# Patient Record
Sex: MALE | URBAN - METROPOLITAN AREA
[De-identification: names, ages, dates, MRNs, and addresses within clinical notes are randomized per-mention and may not be internally consistent; named-entity substitution may affect disease eponyms.]

---

## 2019-04-04 ENCOUNTER — APPOINTMENT (RX ONLY)
Dept: URBAN - METROPOLITAN AREA CLINIC 111 | Facility: CLINIC | Age: 59
Setting detail: DERMATOLOGY
End: 2019-04-04

## 2019-04-04 DIAGNOSIS — D22 MELANOCYTIC NEVI: ICD-10-CM

## 2019-04-04 DIAGNOSIS — Q82.5 CONGENITAL NON-NEOPLASTIC NEVUS: ICD-10-CM

## 2019-04-04 DIAGNOSIS — L82.1 OTHER SEBORRHEIC KERATOSIS: ICD-10-CM

## 2019-04-04 DIAGNOSIS — L81.4 OTHER MELANIN HYPERPIGMENTATION: ICD-10-CM

## 2019-04-04 DIAGNOSIS — L57.0 ACTINIC KERATOSIS: ICD-10-CM

## 2019-04-04 DIAGNOSIS — Z71.89 OTHER SPECIFIED COUNSELING: ICD-10-CM

## 2019-04-04 DIAGNOSIS — D18.0 HEMANGIOMA: ICD-10-CM

## 2019-04-04 PROBLEM — I10 ESSENTIAL (PRIMARY) HYPERTENSION: Status: ACTIVE | Noted: 2019-04-04

## 2019-04-04 PROBLEM — D22.39 MELANOCYTIC NEVI OF OTHER PARTS OF FACE: Status: ACTIVE | Noted: 2019-04-04

## 2019-04-04 PROBLEM — E78.5 HYPERLIPIDEMIA, UNSPECIFIED: Status: ACTIVE | Noted: 2019-04-04

## 2019-04-04 PROBLEM — D18.01 HEMANGIOMA OF SKIN AND SUBCUTANEOUS TISSUE: Status: ACTIVE | Noted: 2019-04-04

## 2019-04-04 PROBLEM — D22.5 MELANOCYTIC NEVI OF TRUNK: Status: ACTIVE | Noted: 2019-04-04

## 2019-04-04 PROCEDURE — 17000 DESTRUCT PREMALG LESION: CPT

## 2019-04-04 PROCEDURE — 99213 OFFICE O/P EST LOW 20 MIN: CPT | Mod: 25

## 2019-04-04 PROCEDURE — 17003 DESTRUCT PREMALG LES 2-14: CPT

## 2019-04-04 PROCEDURE — ? LIQUID NITROGEN

## 2019-04-04 PROCEDURE — ? COUNSELING

## 2019-04-04 ASSESSMENT — LOCATION DETAILED DESCRIPTION DERM
LOCATION DETAILED: LEFT MID-UPPER BACK
LOCATION DETAILED: LEFT MEDIAL EYEBROW
LOCATION DETAILED: LEFT ANTITRAGUS
LOCATION DETAILED: RIGHT ANTERIOR PROXIMAL UPPER ARM
LOCATION DETAILED: RIGHT PROXIMAL PRETIBIAL REGION
LOCATION DETAILED: LEFT PROXIMAL PRETIBIAL REGION
LOCATION DETAILED: LEFT ANTIHELIX
LOCATION DETAILED: RIGHT POSTERIOR SHOULDER
LOCATION DETAILED: RIGHT SUPERIOR CRUS OF ANTIHELIX
LOCATION DETAILED: INFERIOR THORACIC SPINE
LOCATION DETAILED: LEFT INFERIOR HELIX
LOCATION DETAILED: LEFT SUPERIOR HELIX
LOCATION DETAILED: RIGHT CENTRAL EYEBROW
LOCATION DETAILED: LEFT PROXIMAL POSTERIOR UPPER ARM
LOCATION DETAILED: RIGHT SUPERIOR MEDIAL UPPER BACK
LOCATION DETAILED: RIGHT INFERIOR MEDIAL UPPER BACK
LOCATION DETAILED: PERIUMBILICAL SKIN
LOCATION DETAILED: LEFT SUPERIOR CRUS OF ANTIHELIX
LOCATION DETAILED: LEFT INFERIOR ANTERIOR NECK
LOCATION DETAILED: STERNUM

## 2019-04-04 ASSESSMENT — LOCATION ZONE DERM
LOCATION ZONE: ARM
LOCATION ZONE: LEG
LOCATION ZONE: FACE
LOCATION ZONE: NECK
LOCATION ZONE: TRUNK
LOCATION ZONE: EAR

## 2019-04-04 ASSESSMENT — LOCATION SIMPLE DESCRIPTION DERM
LOCATION SIMPLE: RIGHT EYEBROW
LOCATION SIMPLE: LEFT UPPER BACK
LOCATION SIMPLE: LEFT EYEBROW
LOCATION SIMPLE: RIGHT PRETIBIAL REGION
LOCATION SIMPLE: RIGHT EAR
LOCATION SIMPLE: RIGHT UPPER BACK
LOCATION SIMPLE: LEFT EAR
LOCATION SIMPLE: CHEST
LOCATION SIMPLE: LEFT PRETIBIAL REGION
LOCATION SIMPLE: ABDOMEN
LOCATION SIMPLE: LEFT UPPER ARM
LOCATION SIMPLE: LEFT ANTERIOR NECK
LOCATION SIMPLE: UPPER BACK
LOCATION SIMPLE: RIGHT UPPER ARM
LOCATION SIMPLE: RIGHT SHOULDER

## 2019-04-04 NOTE — HPI: EVALUATION OF SKIN LESION(S)
What Type Of Note Output Would You Prefer (Optional)?: Standard Output
Hpi Title: Evaluation of Skin Lesions
Additional History: Patient requests full body skin exam. Last skin exam was 11/2016

## 2019-04-04 NOTE — PROCEDURE: LIQUID NITROGEN
Consent: The patient's consent was obtained including but not limited to risks of crusting, scabbing, blistering, scarring, darker or lighter pigmentary change, recurrence, incomplete removal and infection.
Detail Level: Zone
Post-Care Instructions: I reviewed with the patient in detail post-care instructions. Patient is to wear sunprotection, and avoid picking at any of the treated lesions. Patient may apply Polysporin or Vaseline to crusted or scabbing areas.
Duration Of Freeze Thaw-Cycle (Seconds): 0
Number Of Freeze-Thaw Cycles: 1 freeze-thaw cycle
Render Post-Care Instructions In Note?: no

## 2020-05-19 ENCOUNTER — APPOINTMENT (RX ONLY)
Dept: URBAN - METROPOLITAN AREA CLINIC 111 | Facility: CLINIC | Age: 60
Setting detail: DERMATOLOGY
End: 2020-05-19

## 2020-05-19 DIAGNOSIS — L57.0 ACTINIC KERATOSIS: ICD-10-CM

## 2020-05-19 DIAGNOSIS — Q82.5 CONGENITAL NON-NEOPLASTIC NEVUS: ICD-10-CM

## 2020-05-19 DIAGNOSIS — D22 MELANOCYTIC NEVI: ICD-10-CM

## 2020-05-19 DIAGNOSIS — L82.1 OTHER SEBORRHEIC KERATOSIS: ICD-10-CM

## 2020-05-19 DIAGNOSIS — L81.4 OTHER MELANIN HYPERPIGMENTATION: ICD-10-CM

## 2020-05-19 DIAGNOSIS — D18.0 HEMANGIOMA: ICD-10-CM

## 2020-05-19 DIAGNOSIS — Z71.89 OTHER SPECIFIED COUNSELING: ICD-10-CM

## 2020-05-19 PROBLEM — D18.01 HEMANGIOMA OF SKIN AND SUBCUTANEOUS TISSUE: Status: ACTIVE | Noted: 2020-05-19

## 2020-05-19 PROBLEM — D22.5 MELANOCYTIC NEVI OF TRUNK: Status: ACTIVE | Noted: 2020-05-19

## 2020-05-19 PROCEDURE — ? SUNSCREEN RECOMMENDATIONS

## 2020-05-19 PROCEDURE — 99213 OFFICE O/P EST LOW 20 MIN: CPT | Mod: 25

## 2020-05-19 PROCEDURE — ? COUNSELING

## 2020-05-19 PROCEDURE — 17000 DESTRUCT PREMALG LESION: CPT

## 2020-05-19 PROCEDURE — 17003 DESTRUCT PREMALG LES 2-14: CPT

## 2020-05-19 PROCEDURE — ? LIQUID NITROGEN

## 2020-05-19 ASSESSMENT — LOCATION SIMPLE DESCRIPTION DERM
LOCATION SIMPLE: POSTERIOR NECK
LOCATION SIMPLE: RIGHT UPPER BACK
LOCATION SIMPLE: LEFT UPPER BACK
LOCATION SIMPLE: RIGHT UPPER ARM
LOCATION SIMPLE: UPPER BACK
LOCATION SIMPLE: ABDOMEN
LOCATION SIMPLE: RIGHT CALF
LOCATION SIMPLE: RIGHT FOREHEAD
LOCATION SIMPLE: RIGHT TEMPLE
LOCATION SIMPLE: LEFT EAR
LOCATION SIMPLE: LEFT UPPER ARM
LOCATION SIMPLE: LEFT CALF
LOCATION SIMPLE: RIGHT EAR

## 2020-05-19 ASSESSMENT — LOCATION DETAILED DESCRIPTION DERM
LOCATION DETAILED: RIGHT PROXIMAL POSTERIOR UPPER ARM
LOCATION DETAILED: EPIGASTRIC SKIN
LOCATION DETAILED: INFERIOR THORACIC SPINE
LOCATION DETAILED: LEFT SCAPHA
LOCATION DETAILED: LEFT PROXIMAL CALF
LOCATION DETAILED: RIGHT PROXIMAL CALF
LOCATION DETAILED: RIGHT SUPERIOR HELIX
LOCATION DETAILED: LEFT MEDIAL UPPER BACK
LOCATION DETAILED: LEFT SUPERIOR HELIX
LOCATION DETAILED: RIGHT MEDIAL FOREHEAD
LOCATION DETAILED: RIGHT INFERIOR MEDIAL UPPER BACK
LOCATION DETAILED: RIGHT FOREHEAD
LOCATION DETAILED: RIGHT CENTRAL TEMPLE
LOCATION DETAILED: LEFT PROXIMAL POSTERIOR UPPER ARM
LOCATION DETAILED: LEFT DISTAL POSTERIOR UPPER ARM
LOCATION DETAILED: LEFT POSTERIOR NECK

## 2020-05-19 ASSESSMENT — LOCATION ZONE DERM
LOCATION ZONE: ARM
LOCATION ZONE: NECK
LOCATION ZONE: EAR
LOCATION ZONE: TRUNK
LOCATION ZONE: LEG
LOCATION ZONE: FACE

## 2020-05-19 NOTE — PROCEDURE: LIQUID NITROGEN
Render Note In Bullet Format When Appropriate: No
Post-Care Instructions: CARE INSTRUCTIONS\\nCryotherapy\\n\\nThis information is also available at [email or web address] \\n\\nCryotherapy, using liquid nitrogen, creates a superficial chemical burn. This is used to destroy multiple types of benign and even precancerous lesions on the skin.\\nWhat To Expect\\n    • Anticipate redness, with itching or burning pain initially. Tylenol (if you are able to take Tylenol) or cold compresses can help with any significant discomfort. \\n    • Blisters may occur at the treated area(s) over the next few days. The blister may be clear or bloody and may begin to weep or drain. \\n    • After lesion heals, skin may be slightly darker or lighter at treatment site; this typically fades within a few weeks.\\nCare of Treated Area(s)\\n    • Wash gently each day, but do not scrub. \\n    • Apply Vaseline frequently to minimize irritation and reduce scab formation. \\n    • If the blister is tense and uncomfortable, you may clean with alcohol and puncture with a sterile (cleansed in alcohol) needle. Wear gloves, or wash hands immediately before and after this procedure. \\n    • If open or draining, you may apply Polysporin antibiotic ointment or Vaseline and a bandage, if needed. \\n    • Do not pick at or pull off scab. Allow to completely heal.\\nReturn to Clinic\\n    • If lesion is persistent 3 to 4 weeks after treatment, please return to clinic for additional treatment.\\nNOTE: Cryotherapy may not fully destroy every lesion, every time.  Some lesions may require multiple treatments (which could lead to multiple charges).  Others may require a biopsy if destruction is not effective.
Consent: The patient's consent was obtained including but not limited to risks of crusting, scabbing, blistering, scarring, darker or lighter pigmentary change, recurrence, incomplete removal and infection.
Duration Of Freeze Thaw-Cycle (Seconds): 0
Number Of Freeze-Thaw Cycles: 1 freeze-thaw cycle
Detail Level: Simple

## 2020-05-19 NOTE — PROCEDURE: SUNSCREEN RECOMMENDATIONS

## 2021-05-18 ENCOUNTER — APPOINTMENT (RX ONLY)
Dept: URBAN - METROPOLITAN AREA CLINIC 111 | Facility: CLINIC | Age: 61
Setting detail: DERMATOLOGY
End: 2021-05-18

## 2021-05-18 DIAGNOSIS — D18.0 HEMANGIOMA: ICD-10-CM

## 2021-05-18 DIAGNOSIS — L57.8 OTHER SKIN CHANGES DUE TO CHRONIC EXPOSURE TO NONIONIZING RADIATION: ICD-10-CM

## 2021-05-18 DIAGNOSIS — D22 MELANOCYTIC NEVI: ICD-10-CM

## 2021-05-18 DIAGNOSIS — L57.0 ACTINIC KERATOSIS: ICD-10-CM

## 2021-05-18 DIAGNOSIS — Q82.5 CONGENITAL NON-NEOPLASTIC NEVUS: ICD-10-CM

## 2021-05-18 DIAGNOSIS — F42.4 EXCORIATION (SKIN-PICKING) DISORDER: ICD-10-CM

## 2021-05-18 DIAGNOSIS — L82.1 OTHER SEBORRHEIC KERATOSIS: ICD-10-CM

## 2021-05-18 DIAGNOSIS — D485 NEOPLASM OF UNCERTAIN BEHAVIOR OF SKIN: ICD-10-CM

## 2021-05-18 DIAGNOSIS — L81.4 OTHER MELANIN HYPERPIGMENTATION: ICD-10-CM

## 2021-05-18 DIAGNOSIS — Z71.89 OTHER SPECIFIED COUNSELING: ICD-10-CM

## 2021-05-18 PROBLEM — S50.912A UNSPECIFIED SUPERFICIAL INJURY OF LEFT FOREARM, INITIAL ENCOUNTER: Status: ACTIVE | Noted: 2021-05-18

## 2021-05-18 PROBLEM — D22.5 MELANOCYTIC NEVI OF TRUNK: Status: ACTIVE | Noted: 2021-05-18

## 2021-05-18 PROBLEM — D48.5 NEOPLASM OF UNCERTAIN BEHAVIOR OF SKIN: Status: ACTIVE | Noted: 2021-05-18

## 2021-05-18 PROBLEM — D18.01 HEMANGIOMA OF SKIN AND SUBCUTANEOUS TISSUE: Status: ACTIVE | Noted: 2021-05-18

## 2021-05-18 PROCEDURE — 11102 TANGNTL BX SKIN SINGLE LES: CPT

## 2021-05-18 PROCEDURE — ? LIQUID NITROGEN

## 2021-05-18 PROCEDURE — 17003 DESTRUCT PREMALG LES 2-14: CPT

## 2021-05-18 PROCEDURE — ? COUNSELING

## 2021-05-18 PROCEDURE — 17000 DESTRUCT PREMALG LESION: CPT | Mod: 59

## 2021-05-18 PROCEDURE — ? SUNSCREEN RECOMMENDATIONS

## 2021-05-18 PROCEDURE — ? BIOPSY BY SHAVE METHOD

## 2021-05-18 PROCEDURE — 99213 OFFICE O/P EST LOW 20 MIN: CPT | Mod: 25

## 2021-05-18 ASSESSMENT — LOCATION DETAILED DESCRIPTION DERM
LOCATION DETAILED: LEFT PROXIMAL POSTERIOR UPPER ARM
LOCATION DETAILED: RIGHT INFERIOR MEDIAL MIDBACK
LOCATION DETAILED: LEFT DISTAL POSTERIOR UPPER ARM
LOCATION DETAILED: LEFT SUPERIOR POSTERIOR HELIX
LOCATION DETAILED: EPIGASTRIC SKIN
LOCATION DETAILED: LEFT PROXIMAL DORSAL FOREARM
LOCATION DETAILED: LEFT POSTERIOR NECK
LOCATION DETAILED: LEFT RADIAL DORSAL HAND
LOCATION DETAILED: INFERIOR THORACIC SPINE
LOCATION DETAILED: LEFT CENTRAL MALAR CHEEK
LOCATION DETAILED: RIGHT MEDIAL FOREHEAD
LOCATION DETAILED: RIGHT PROXIMAL CALF
LOCATION DETAILED: RIGHT RADIAL DORSAL HAND
LOCATION DETAILED: RIGHT PROXIMAL POSTERIOR UPPER ARM
LOCATION DETAILED: RIGHT INFERIOR POSTERIOR HELIX
LOCATION DETAILED: RIGHT INFERIOR MEDIAL UPPER BACK
LOCATION DETAILED: RIGHT ANTERIOR EARLOBE
LOCATION DETAILED: RIGHT INFERIOR CENTRAL MALAR CHEEK
LOCATION DETAILED: LEFT MEDIAL UPPER BACK
LOCATION DETAILED: LEFT PROXIMAL CALF
LOCATION DETAILED: LEFT INFERIOR CENTRAL MALAR CHEEK
LOCATION DETAILED: LEFT LATERAL EYEBROW
LOCATION DETAILED: LEFT INFERIOR POSTERIOR HELIX
LOCATION DETAILED: RIGHT SUPERIOR POSTERIOR HELIX

## 2021-05-18 ASSESSMENT — LOCATION SIMPLE DESCRIPTION DERM
LOCATION SIMPLE: LEFT EAR
LOCATION SIMPLE: LEFT CALF
LOCATION SIMPLE: RIGHT LOWER BACK
LOCATION SIMPLE: RIGHT EAR
LOCATION SIMPLE: POSTERIOR NECK
LOCATION SIMPLE: LEFT EYEBROW
LOCATION SIMPLE: ABDOMEN
LOCATION SIMPLE: UPPER BACK
LOCATION SIMPLE: RIGHT HAND
LOCATION SIMPLE: RIGHT FOREHEAD
LOCATION SIMPLE: LEFT FOREARM
LOCATION SIMPLE: LEFT CHEEK
LOCATION SIMPLE: LEFT HAND
LOCATION SIMPLE: LEFT UPPER ARM
LOCATION SIMPLE: RIGHT CHEEK
LOCATION SIMPLE: RIGHT UPPER BACK
LOCATION SIMPLE: LEFT UPPER BACK
LOCATION SIMPLE: RIGHT CALF
LOCATION SIMPLE: RIGHT UPPER ARM

## 2021-05-18 ASSESSMENT — LOCATION ZONE DERM
LOCATION ZONE: HAND
LOCATION ZONE: ARM
LOCATION ZONE: EAR
LOCATION ZONE: NECK
LOCATION ZONE: TRUNK
LOCATION ZONE: FACE
LOCATION ZONE: LEG

## 2021-05-18 NOTE — PROCEDURE: BIOPSY BY SHAVE METHOD
Body Location Override (Optional - Billing Will Still Be Based On Selected Body Map Location If Applicable): right dorsal hand
Detail Level: Detailed
Depth Of Biopsy: dermis
Was A Bandage Applied: Yes
Size Of Lesion In Cm: 1
X Size Of Lesion In Cm: 0
Biopsy Type: H and E
Biopsy Method: Dermablade
Anesthesia Type: 1% lidocaine with epinephrine
Anesthesia Volume In Cc (Will Not Render If 0): 0.5
Hemostasis: Drysol
Wound Care: Polysporin ointment
Dressing: bandage
Destruction After The Procedure: No
Type Of Destruction Used: Curettage
Curettage Text: The wound bed was treated with curettage after the biopsy was performed.
Cryotherapy Text: The wound bed was treated with cryotherapy after the biopsy was performed.
Electrodesiccation Text: The wound bed was treated with electrodesiccation after the biopsy was performed.
Electrodesiccation And Curettage Text: The wound bed was treated with electrodesiccation and curettage after the biopsy was performed.
Silver Nitrate Text: The wound bed was treated with silver nitrate after the biopsy was performed.
Lab: [id number]
Lab Facility: [id number]
Consent: Verbal consent was obtained and risks were reviewed including but not limited to scarring, infection, bleeding, scabbing, incomplete removal, nerve damage and allergy to anesthesia.
Post-Care Instructions: CARE INSTRUCTIONS: SHAVE BIOPSY \\nThis information is also available on our website: [email or web address]\\nWound Site Care\\n    • Keep wound dry today and remove bandage in 24 hours. \\n    • Shower normally, allowing soap and water to run across the wound, but do not scrub. Antibacterial soaps, such as Dial, may be used daily. \\n    • Keep wound moist with application of Polysporin ointment or Vaseline 1-2 times daily. (If you have itching or irritation from topical antibiotics like Polysporin, then switch to plain Vaseline.) Keeping the wound moist reduces infection, minimizes scarring, and prevents crust formation over the wound. \\n    • Cover with clean bandage daily. If you have steri-strips (small white bandages) across the wound, leave intact. These typically fall off within a few days. \\n    • Avoid picking at the wound site, which increases risk of infection and scarring. \\n    • If wound is near the eyes, cold compresses may be used for 20 minutes each hour to minimize pain, swelling, and bruising. Puffiness under the eyes is possible for a few days after the procedure. \\n    • For any wound discomfort, you may take Tylenol (or acetaminophen). Adults may take 500-1000 mg every six hours (if you are able to take Tylenol). Or use cold compresses for up to 20 minutes hourly as needed. \\n    • Allow several weeks for wound to fully heal. Temporary discoloration at the wound site is normal and may take several months to fade.\\nIf You Have Bleeding\\n    • Hold firm pressure for 20 minutes, without lifting the pressure to look at wound. Repeat as needed. \\n    • If bleeding does not stop, apply ice compresses and call our office (or report to the nearest ER.)\\nActivities to Avoid\\nIf your wound is large or has sutures, then until sutures are removed you should:\\n    • Avoid contaminated water (lake, river, or ocean); use waterproof bandage in chlorinated pool. \\n    • If sutures are present, minimize activity that produces stress to excision site (heavy lifting or exercise that pulls at the wound, such as lunges for thigh wounds or golf for upper back wounds)\\nWatch for Infection\\n    • Slight redness, initial tenderness, and clear yellow discharge are normal. \\n    • Call the office if you have signs of infection, such as increased tenderness, warmth, spreading redness, swelling, or thick white-to-yellow discharge. \\n    • Seek urgent medical attention for severe infection, with fever, chills, nausea, or pain radiating from the wound to surrounding tissue.\\nNext Steps\\n    • If you have sutures, return for suture removal in 5-14 days, as directed by physician, but call sooner if you are concerned about your wound. \\n    • Pathology results will be given by telephone in 7-10 days. Sometimes results may take longer depending on if special stains or testing is needed. If you have not received results after 14 days it is important that you call our office and let us know. \\n    • Return to clinic if you notice any new or changing moles, or if physician recommended regular skin exams.\\nLaboratory Billing Information\\n    • Your specimen has been sent for laboratory testing. The lab our office typically uses is [facility], if you have any questions about your bill.\\n    • If you have insurance coverage, we have forwarded your insurance details to the lab. If a balance is owed once the claim has been processed, you will receive an invoice from the laboratory directly
Notification Instructions: Patient will be notified of biopsy results. However, patient instructed to call the office if not contacted within 2 weeks.
Billing Type: Third-Party Bill
Information: Selecting Yes will display possible errors in your note based on the variables you have selected. This validation is only offered as a suggestion for you. PLEASE NOTE THAT THE VALIDATION TEXT WILL BE REMOVED WHEN YOU FINALIZE YOUR NOTE. IF YOU WANT TO FAX A PRELIMINARY NOTE YOU WILL NEED TO TOGGLE THIS TO 'NO' IF YOU DO NOT WANT IT IN YOUR FAXED NOTE.

## 2021-05-18 NOTE — HPI: EVALUATION OF SKIN LESION(S)
What Type Of Note Output Would You Prefer (Optional)?: Standard Output
How Severe Are Your Spot(S)?: moderate
Hpi Title: Evaluation of Skin Lesions
Additional History: Last FBSE 5/2020 with [facility].

## 2021-05-18 NOTE — PROCEDURE: SUNSCREEN RECOMMENDATIONS

## 2021-05-18 NOTE — PROCEDURE: LIQUID NITROGEN
Consent: The patient's consent was obtained including but not limited to risks of crusting, scabbing, blistering, scarring, darker or lighter pigmentary change, recurrence, incomplete removal and infection.
Post-Care Instructions: CARE INSTRUCTIONS\\nCryotherapy\\n\\nThis information is also available at [email or web address] \\n\\nCryotherapy, using liquid nitrogen, creates a superficial chemical burn. This is used to destroy multiple types of benign and even precancerous lesions on the skin.\\nWhat To Expect\\n    • Anticipate redness, with itching or burning pain initially. Tylenol (if you are able to take Tylenol) or cold compresses can help with any significant discomfort. \\n    • Blisters may occur at the treated area(s) over the next few days. The blister may be clear or bloody and may begin to weep or drain. \\n    • After lesion heals, skin may be slightly darker or lighter at treatment site; this typically fades within a few weeks.\\nCare of Treated Area(s)\\n    • Wash gently each day, but do not scrub. \\n    • Apply Vaseline frequently to minimize irritation and reduce scab formation. \\n    • If the blister is tense and uncomfortable, you may clean with alcohol and puncture with a sterile (cleansed in alcohol) needle. Wear gloves, or wash hands immediately before and after this procedure. \\n    • If open or draining, you may apply Polysporin antibiotic ointment or Vaseline and a bandage, if needed. \\n    • Do not pick at or pull off scab. Allow to completely heal.\\nReturn to Clinic\\n    • If lesion is persistent 3 to 4 weeks after treatment, please return to clinic for additional treatment.\\nNOTE: Cryotherapy may not fully destroy every lesion, every time.  Some lesions may require multiple treatments (which could lead to multiple charges).  Others may require a biopsy if destruction is not effective.
Detail Level: Simple
Render Post-Care Instructions In Note?: no
Duration Of Freeze Thaw-Cycle (Seconds): 0
Number Of Freeze-Thaw Cycles: 1 freeze-thaw cycle

## 2021-06-01 ENCOUNTER — APPOINTMENT (RX ONLY)
Dept: URBAN - METROPOLITAN AREA CLINIC 111 | Facility: CLINIC | Age: 61
Setting detail: DERMATOLOGY
End: 2021-06-01

## 2021-06-01 DIAGNOSIS — L57.0 ACTINIC KERATOSIS: ICD-10-CM

## 2021-06-01 PROCEDURE — ? COUNSELING

## 2021-06-01 PROCEDURE — ? LIQUID NITROGEN

## 2021-06-01 PROCEDURE — 17003 DESTRUCT PREMALG LES 2-14: CPT

## 2021-06-01 PROCEDURE — 17000 DESTRUCT PREMALG LESION: CPT

## 2021-06-01 ASSESSMENT — LOCATION SIMPLE DESCRIPTION DERM
LOCATION SIMPLE: RIGHT CHEEK
LOCATION SIMPLE: RIGHT HAND

## 2021-06-01 ASSESSMENT — LOCATION ZONE DERM
LOCATION ZONE: FACE
LOCATION ZONE: HAND

## 2021-06-01 ASSESSMENT — LOCATION DETAILED DESCRIPTION DERM
LOCATION DETAILED: RIGHT INFERIOR CENTRAL MALAR CHEEK
LOCATION DETAILED: RIGHT INFERIOR LATERAL MALAR CHEEK
LOCATION DETAILED: RIGHT RADIAL DORSAL HAND

## 2021-06-01 NOTE — PROCEDURE: LIQUID NITROGEN
Post-Care Instructions: CARE INSTRUCTIONS\\nCryotherapy\\n\\nThis information is also available at [email or web address] \\n\\nCryotherapy, using liquid nitrogen, creates a superficial chemical burn. This is used to destroy multiple types of benign and even precancerous lesions on the skin.\\nWhat To Expect\\n    • Anticipate redness, with itching or burning pain initially. Tylenol (if you are able to take Tylenol) or cold compresses can help with any significant discomfort. \\n    • Blisters may occur at the treated area(s) over the next few days. The blister may be clear or bloody and may begin to weep or drain. \\n    • After lesion heals, skin may be slightly darker or lighter at treatment site; this typically fades within a few weeks.\\nCare of Treated Area(s)\\n    • Wash gently each day, but do not scrub. \\n    • Apply Vaseline frequently to minimize irritation and reduce scab formation. \\n    • If the blister is tense and uncomfortable, you may clean with alcohol and puncture with a sterile (cleansed in alcohol) needle. Wear gloves, or wash hands immediately before and after this procedure. \\n    • If open or draining, you may apply Polysporin antibiotic ointment or Vaseline and a bandage, if needed. \\n    • Do not pick at or pull off scab. Allow to completely heal.\\nReturn to Clinic\\n    • If lesion is persistent 3 to 4 weeks after treatment, please return to clinic for additional treatment.\\nNOTE: Cryotherapy may not fully destroy every lesion, every time.  Some lesions may require multiple treatments (which could lead to multiple charges).  Others may require a biopsy if destruction is not effective.
Number Of Freeze-Thaw Cycles: 1 freeze-thaw cycle
Duration Of Freeze Thaw-Cycle (Seconds): 0
Render Note In Bullet Format When Appropriate: No
Detail Level: Simple
Consent: The patient's consent was obtained including but not limited to risks of crusting, scabbing, blistering, scarring, darker or lighter pigmentary change, recurrence, incomplete removal and infection.

## 2022-05-17 ENCOUNTER — APPOINTMENT (RX ONLY)
Dept: URBAN - METROPOLITAN AREA CLINIC 111 | Facility: CLINIC | Age: 62
Setting detail: DERMATOLOGY
End: 2022-05-17

## 2022-05-17 DIAGNOSIS — Z71.89 OTHER SPECIFIED COUNSELING: ICD-10-CM

## 2022-05-17 DIAGNOSIS — D22 MELANOCYTIC NEVI: ICD-10-CM

## 2022-05-17 DIAGNOSIS — L57.0 ACTINIC KERATOSIS: ICD-10-CM

## 2022-05-17 DIAGNOSIS — L57.8 OTHER SKIN CHANGES DUE TO CHRONIC EXPOSURE TO NONIONIZING RADIATION: ICD-10-CM

## 2022-05-17 DIAGNOSIS — L70.8 OTHER ACNE: ICD-10-CM

## 2022-05-17 DIAGNOSIS — D18.0 HEMANGIOMA: ICD-10-CM

## 2022-05-17 DIAGNOSIS — L81.4 OTHER MELANIN HYPERPIGMENTATION: ICD-10-CM

## 2022-05-17 DIAGNOSIS — L82.1 OTHER SEBORRHEIC KERATOSIS: ICD-10-CM

## 2022-05-17 PROBLEM — D22.5 MELANOCYTIC NEVI OF TRUNK: Status: ACTIVE | Noted: 2022-05-17

## 2022-05-17 PROBLEM — D18.01 HEMANGIOMA OF SKIN AND SUBCUTANEOUS TISSUE: Status: ACTIVE | Noted: 2022-05-17

## 2022-05-17 PROCEDURE — ? COUNSELING

## 2022-05-17 PROCEDURE — ? LIQUID NITROGEN

## 2022-05-17 PROCEDURE — ? DIAGNOSIS COMMENT

## 2022-05-17 PROCEDURE — 17003 DESTRUCT PREMALG LES 2-14: CPT

## 2022-05-17 PROCEDURE — 99213 OFFICE O/P EST LOW 20 MIN: CPT | Mod: 25

## 2022-05-17 PROCEDURE — 17000 DESTRUCT PREMALG LESION: CPT

## 2022-05-17 PROCEDURE — ? SUNSCREEN RECOMMENDATIONS

## 2022-05-17 ASSESSMENT — LOCATION DETAILED DESCRIPTION DERM
LOCATION DETAILED: RIGHT PROXIMAL CALF
LOCATION DETAILED: EPIGASTRIC SKIN
LOCATION DETAILED: LEFT PROXIMAL POSTERIOR UPPER ARM
LOCATION DETAILED: RIGHT SUPERIOR LATERAL MIDBACK
LOCATION DETAILED: LEFT PROXIMAL CALF
LOCATION DETAILED: RIGHT MEDIAL FOREHEAD
LOCATION DETAILED: RIGHT TRIANGULAR FOSSA
LOCATION DETAILED: LEFT MEDIAL UPPER BACK
LOCATION DETAILED: LEFT PROXIMAL PRETIBIAL REGION
LOCATION DETAILED: RIGHT INFERIOR MEDIAL UPPER BACK
LOCATION DETAILED: RIGHT DISTAL RADIAL DORSAL FOREARM
LOCATION DETAILED: INFERIOR THORACIC SPINE
LOCATION DETAILED: RIGHT PROXIMAL POSTERIOR UPPER ARM
LOCATION DETAILED: LEFT SUPERIOR CENTRAL MALAR CHEEK
LOCATION DETAILED: RIGHT SUPERIOR HELIX
LOCATION DETAILED: RIGHT INFERIOR MEDIAL FOREHEAD
LOCATION DETAILED: RIGHT CENTRAL TEMPLE
LOCATION DETAILED: LEFT SUPERIOR HELIX
LOCATION DETAILED: LEFT POSTERIOR NECK

## 2022-05-17 ASSESSMENT — LOCATION SIMPLE DESCRIPTION DERM
LOCATION SIMPLE: LEFT EAR
LOCATION SIMPLE: RIGHT EAR
LOCATION SIMPLE: RIGHT UPPER BACK
LOCATION SIMPLE: LEFT PRETIBIAL REGION
LOCATION SIMPLE: RIGHT FOREHEAD
LOCATION SIMPLE: LEFT CALF
LOCATION SIMPLE: LEFT CHEEK
LOCATION SIMPLE: RIGHT TEMPLE
LOCATION SIMPLE: RIGHT LOWER BACK
LOCATION SIMPLE: RIGHT CALF
LOCATION SIMPLE: POSTERIOR NECK
LOCATION SIMPLE: LEFT UPPER BACK
LOCATION SIMPLE: RIGHT FOREARM
LOCATION SIMPLE: UPPER BACK
LOCATION SIMPLE: ABDOMEN
LOCATION SIMPLE: RIGHT UPPER ARM
LOCATION SIMPLE: LEFT UPPER ARM

## 2022-05-17 ASSESSMENT — LOCATION ZONE DERM
LOCATION ZONE: EAR
LOCATION ZONE: TRUNK
LOCATION ZONE: LEG
LOCATION ZONE: FACE
LOCATION ZONE: ARM
LOCATION ZONE: NECK

## 2022-05-17 NOTE — PROCEDURE: DIAGNOSIS COMMENT
Render Risk Assessment In Note?: no
Detail Level: Simple
Comment: Advised patient to RTC if lesion does not resolve.

## 2022-05-17 NOTE — PROCEDURE: LIQUID NITROGEN
Post-Care Instructions: CARE INSTRUCTIONS\\nCryotherapy\\n\\nThis information is also available at [email or web address] \\n\\nCryotherapy, using liquid nitrogen, creates a superficial chemical burn. This is used to destroy multiple types of benign and even precancerous lesions on the skin.\\nWhat To Expect\\n    • Anticipate redness, with itching or burning pain initially. Tylenol (if you are able to take Tylenol) or cold compresses can help with any significant discomfort. \\n    • Blisters may occur at the treated area(s) over the next few days. The blister may be clear or bloody and may begin to weep or drain. \\n    • After lesion heals, skin may be slightly darker or lighter at treatment site; this typically fades within a few weeks.\\nCare of Treated Area(s)\\n    • Wash gently each day, but do not scrub. \\n    • Apply Vaseline frequently to minimize irritation and reduce scab formation. \\n    • If the blister is tense and uncomfortable, you may clean with alcohol and puncture with a sterile (cleansed in alcohol) needle. Wear gloves, or wash hands immediately before and after this procedure. \\n    • If open or draining, you may apply Polysporin antibiotic ointment or Vaseline and a bandage, if needed. \\n    • Do not pick at or pull off scab. Allow to completely heal.\\nReturn to Clinic\\n    • If lesion is persistent 3 to 4 weeks after treatment, please return to clinic for additional treatment.\\nNOTE: Cryotherapy may not fully destroy every lesion, every time.  Some lesions may require multiple treatments (which could lead to multiple charges).  Others may require a biopsy if destruction is not effective.
Duration Of Freeze Thaw-Cycle (Seconds): 0
Render Note In Bullet Format When Appropriate: No
Show Applicator Variable?: Yes
Consent: The patient's consent was obtained including but not limited to risks of crusting, scabbing, blistering, scarring, darker or lighter pigmentary change, recurrence, incomplete removal and infection.
Detail Level: Simple
Number Of Freeze-Thaw Cycles: 1 freeze-thaw cycle

## 2022-05-17 NOTE — HPI: EVALUATION OF SKIN LESION(S)
What Type Of Note Output Would You Prefer (Optional)?: Standard Output
How Severe Are Your Spot(S)?: moderate
Hpi Title: Evaluation of Skin Lesions
Additional History: Patient last full body skin exam 05/2021